# Patient Record
Sex: MALE | Race: WHITE | NOT HISPANIC OR LATINO | URBAN - METROPOLITAN AREA
[De-identification: names, ages, dates, MRNs, and addresses within clinical notes are randomized per-mention and may not be internally consistent; named-entity substitution may affect disease eponyms.]

---

## 2018-02-20 ENCOUNTER — APPOINTMENT (OUTPATIENT)
Dept: CT IMAGING | Facility: HOSPITAL | Age: 23
End: 2018-02-20

## 2018-02-20 ENCOUNTER — HOSPITAL ENCOUNTER (EMERGENCY)
Facility: HOSPITAL | Age: 23
Discharge: HOME | End: 2018-02-20
Attending: EMERGENCY MEDICINE | Admitting: EMERGENCY MEDICINE

## 2018-02-20 VITALS
SYSTOLIC BLOOD PRESSURE: 131 MMHG | HEIGHT: 74 IN | OXYGEN SATURATION: 99 % | WEIGHT: 295 LBS | BODY MASS INDEX: 37.86 KG/M2 | TEMPERATURE: 98.1 F | DIASTOLIC BLOOD PRESSURE: 80 MMHG | HEART RATE: 70 BPM | RESPIRATION RATE: 16 BRPM

## 2018-02-20 DIAGNOSIS — R51.9 ACUTE NONINTRACTABLE HEADACHE, UNSPECIFIED HEADACHE TYPE: Primary | ICD-10-CM

## 2018-02-20 PROCEDURE — 99283 EMERGENCY DEPT VISIT LOW MDM: CPT

## 2018-02-20 PROCEDURE — 70450 CT HEAD/BRAIN W/O DYE: CPT

## 2018-02-20 NOTE — ED PROVIDER NOTES
EMERGENCY DEPARTMENT ENCOUNTER    CHIEF COMPLAINT  Chief Complaint: HA  History given by: patient  History limited by: nothing  Room Number: 52/52  PMD: No Known Provider      HPI:  Pt is a 22 y.o. [place], who presents complaining of a severe bitemporal, pulsing HA that began around 0830 this morning. Pt states that he lost his vision briefly and had light headedness when his HA began and states that he had several episodes of N/V. Pt denies recent injury or fever but complains of chills. Pt states that he received Toradol at [facility], which improved his HA.    Duration:  4.5 hours  Onset: gradual  Timing: constant  Location: bitemporal  Quality: pulsing  Intensity/Severity: severe  Progression: improved  Associated Symptoms: vision loss, light headedness, N/V, chills  Aggravating Factors: none  Alleviating Factors: Toradol  Previous Episodes: Pt denies having similar symptoms previously.  Treatment before arrival: Pt states that he received Toradol at [facility], which improved his HA.    PAST MEDICAL HISTORY  Active Ambulatory Problems     Diagnosis Date Noted   • No Active Ambulatory Problems     Resolved Ambulatory Problems     Diagnosis Date Noted   • No Resolved Ambulatory Problems     Past Medical History:   Diagnosis Date   • Asthma    • Heart disease    • PFO (patent foramen ovale)        PAST SURGICAL HISTORY  Past Surgical History:   Procedure Laterality Date   • CLAVICLE SURGERY         FAMILY HISTORY  Family History   Problem Relation Age of Onset   • Heart disease Father    • Heart disease Paternal Grandmother    • Heart disease Paternal Grandfather    • Heart disease Paternal Aunt        SOCIAL HISTORY  Social History     Social History   • Marital status: Single     Spouse name: N/A   • Number of children: N/A   • Years of education: N/A     Occupational History   • Not on file.     Social History Main Topics   • Smoking status: Never Smoker   • Smokeless tobacco: Never Used   • Alcohol use  Yes   • Drug use: Not on file   • Sexual activity: Not on file     Other Topics Concern   • Not on file     Social History Narrative   • No narrative on file       ALLERGIES  Amoxicillin and Penicillins    REVIEW OF SYSTEMS  Review of Systems   Constitutional: Positive for chills. Negative for activity change, appetite change and fever.   HENT: Negative for congestion and sore throat.    Eyes: Positive for visual disturbance (vision loss, now resolved).   Respiratory: Negative for cough and shortness of breath.    Cardiovascular: Negative for chest pain and leg swelling.   Gastrointestinal: Positive for nausea and vomiting. Negative for abdominal pain and diarrhea.   Endocrine: Negative.    Genitourinary: Negative for decreased urine volume and dysuria.   Musculoskeletal: Negative for neck pain.   Skin: Negative for rash and wound.   Allergic/Immunologic: Negative.    Neurological: Positive for light-headedness and headaches. Negative for weakness and numbness.   Hematological: Negative.    Psychiatric/Behavioral: Negative.    All other systems reviewed and are negative.      PHYSICAL EXAM  ED Triage Vitals   Temp Heart Rate Resp BP SpO2   02/20/18 1221 02/20/18 1221 02/20/18 1253 02/20/18 1253 02/20/18 1221   98.9 °F (37.2 °C) 77 16 139/88 98 %      Temp src Heart Rate Source Patient Position BP Location FiO2 (%)   02/20/18 1221 -- -- -- --   Tympanic           Physical Exam   Constitutional: He is oriented to person, place, and time and well-developed, well-nourished, and in no distress.   HENT:   Head: Normocephalic and atraumatic.   Eyes: EOM are normal. Pupils are equal, round, and reactive to light.   Neck: Normal range of motion. Neck supple.   No meningismus   Cardiovascular: Normal rate, regular rhythm and normal heart sounds.    Pulmonary/Chest: Effort normal and breath sounds normal. No respiratory distress.   Abdominal: Soft. There is no tenderness. There is no rebound and no guarding.   Musculoskeletal:  Normal range of motion. He exhibits no edema.   Neurological: He is alert and oriented to person, place, and time. He has normal sensation and normal strength. No cranial nerve deficit. Gait normal.   Skin: Skin is warm and dry.   Psychiatric: Mood and affect normal.   Nursing note and vitals reviewed.    RADIOLOGY  CT Head Without Contrast    (Results Pending)      1433- Reviewed pt's CT Head, which shows nothing acute. Independently viewed by me. Interpreted by radiologist. Discussed with Dr. Euceda.    I ordered the above noted radiological studies. Interpreted by radiologist. Discussed with radiologist (Dr. Euceda). Reviewed by me in PACS.       PROCEDURES  Procedures      PROGRESS AND CONSULTS  ED Course     1300- Ordered CT head for further evaluation.    1444- Rechecked pt. Pt is resting comfortably and states that his HA is resolved. Notified pt of his CT Head results and the plan to discharge the pt home. I instructed the pt to follow up with his PCP when he returns home to [place] or to ER if he has any concerns. Pt understands and agrees with the plan, all questions answered.    MEDICAL DECISION MAKING  Results were reviewed/discussed with the patient and they were also made aware of online access. Pt also made aware that follow up with PMD is necessary.     MDM  Number of Diagnoses or Management Options  Acute nonintractable headache, unspecified headache type:      Amount and/or Complexity of Data Reviewed  Tests in the radiology section of CPT®: ordered and reviewed (CT Head shows nothing acute)  Discussion of test results with the performing providers: yes (D/w Dr. Euceda)  Decide to obtain previous medical records or to obtain history from someone other than the patient: yes  Review and summarize past medical records: yes (Pt was seen at [facility] and received toradol for his HA.)  Independent visualization of images, tracings, or specimens: yes    Patient Progress  Patient progress: resolved          DIAGNOSIS  Final diagnoses:   Acute nonintractable headache, unspecified headache type       DISPOSITION  DISCHARGE    Patient discharged in stable condition.    Reviewed implications of results, diagnosis, meds, responsibility to follow up, warning signs and symptoms of possible worsening, potential complications and reasons to return to ER, including fever, worsening pain or any concerns.    Patient/Family voiced understanding of above instructions.    Discussed plan for discharge, as there is no emergent indication for admission.  Pt/family is agreeable and understands need for follow up and repeat testing.  Pt is aware that discharge does not mean that nothing is wrong but it indicates no emergency is present that requires admission and they must continue care with follow-up as given below or physician of their choice.     FOLLOW-UP  Your PCP    Call  As needed, If symptoms worsen         Medication List      Notice     No changes were made to your prescriptions during this visit.            Latest Documented Vital Signs:  As of 2:48 PM  BP- 139/88 HR- 77 Temp- 98.9 °F (37.2 °C) (Tympanic) O2 sat- 98%    --  Documentation assistance provided by octavio Varner for Dr. Patiño.  Information recorded by the octavio was done at my direction and has been verified and validated by me.     Karoline Varner  02/20/18 8089       Rg Patiño MD  02/20/18 4893